# Patient Record
Sex: MALE | Race: WHITE | URBAN - METROPOLITAN AREA
[De-identification: names, ages, dates, MRNs, and addresses within clinical notes are randomized per-mention and may not be internally consistent; named-entity substitution may affect disease eponyms.]

---

## 2020-01-30 ENCOUNTER — DOCTOR'S OFFICE (OUTPATIENT)
Dept: URBAN - METROPOLITAN AREA CLINIC 125 | Facility: CLINIC | Age: 45
Setting detail: OPHTHALMOLOGY
End: 2020-01-30
Payer: COMMERCIAL

## 2020-01-30 ENCOUNTER — RX ONLY (RX ONLY)
Age: 45
End: 2020-01-30

## 2020-01-30 DIAGNOSIS — H52.4: ICD-10-CM

## 2020-01-30 PROCEDURE — 92015 DETERMINE REFRACTIVE STATE: CPT | Performed by: OPHTHALMOLOGY

## 2020-01-30 PROCEDURE — 92004 COMPRE OPH EXAM NEW PT 1/>: CPT | Performed by: OPHTHALMOLOGY

## 2020-01-30 PROCEDURE — 92310 CONTACT LENS FITTING OU: CPT | Performed by: OPHTHALMOLOGY

## 2020-01-30 ASSESSMENT — CONFRONTATIONAL VISUAL FIELD TEST (CVF)
OS_FINDINGS: FULL
OD_FINDINGS: FULL

## 2020-01-31 ASSESSMENT — REFRACTION_MANIFEST
OS_ADD: +1.50
OD_CYLINDER: -0.50
OS_SPHERE: -2.75
OD_VA1: 20/20
OS_VA2: 20/
OD_VA2: 20/
OS_VA1: 20/20
OD_AXIS: 180
OS_VA3: 20/
OU_VA: 20/
OD_VA3: 20/
OD_ADD: +1.50
OD_SPHERE: -2.75

## 2020-01-31 ASSESSMENT — REFRACTION_CURRENTRX
OS_AXIS: 017
OD_AXIS: 179
OD_OVR_VA: 20/
OD_VPRISM_DIRECTION: SV
OS_OVR_VA: 20/
OD_CYLINDER: -0.50
OS_SPHERE: -3.25
OD_SPHERE: -3.00
OS_VPRISM_DIRECTION: SV
OS_CYLINDER: -0.25

## 2020-01-31 ASSESSMENT — VISUAL ACUITY
OS_BCVA: 20/25+2
OD_BCVA: 20/20

## 2020-01-31 ASSESSMENT — KERATOMETRY
OS_AXISANGLE_DEGREES: 179
OD_K2POWER_DIOPTERS: 42.25
OD_AXISANGLE_DEGREES: 2
OS_K1POWER_DIOPTERS: 42.25
OD_K1POWER_DIOPTERS: 41.75
OS_K2POWER_DIOPTERS: 43.25

## 2020-01-31 ASSESSMENT — SPHEQUIV_DERIVED
OS_SPHEQUIV: -2.75
OD_SPHEQUIV: -2.75
OD_SPHEQUIV: -3

## 2020-01-31 ASSESSMENT — REFRACTION_AUTOREFRACTION
OS_SPHERE: -2.50
OS_CYLINDER: -0.50
OS_AXIS: 078
OD_SPHERE: -2.50
OD_CYLINDER: -0.50
OD_AXIS: 158

## 2020-01-31 ASSESSMENT — AXIALLENGTH_DERIVED
OD_AL: 25.3341
OD_AL: 25.4469
OS_AL: 25.0203

## 2020-02-04 ENCOUNTER — OPTICAL OFFICE (OUTPATIENT)
Dept: URBAN - NONMETROPOLITAN AREA CLINIC 4 | Facility: CLINIC | Age: 45
Setting detail: OPHTHALMOLOGY
End: 2020-02-04
Payer: COMMERCIAL

## 2020-02-04 DIAGNOSIS — H52.13: ICD-10-CM

## 2020-02-04 PROCEDURE — S0500 DISPOS CONT LENS: HCPCS | Performed by: OPHTHALMOLOGY

## 2020-02-13 ENCOUNTER — OPTICAL OFFICE (OUTPATIENT)
Dept: URBAN - NONMETROPOLITAN AREA CLINIC 4 | Facility: CLINIC | Age: 45
Setting detail: OPHTHALMOLOGY
End: 2020-02-13
Payer: COMMERCIAL

## 2020-02-13 DIAGNOSIS — H52.13: ICD-10-CM

## 2020-02-13 PROCEDURE — V2100 LENS SPHER SINGLE PLANO 4.00: HCPCS | Performed by: OPHTHALMOLOGY

## 2020-02-13 PROCEDURE — V2020 VISION SVCS FRAMES PURCHASES: HCPCS | Performed by: OPHTHALMOLOGY

## 2020-02-13 PROCEDURE — V2784 LENS POLYCARB OR EQUAL: HCPCS | Performed by: OPHTHALMOLOGY

## 2020-02-13 PROCEDURE — V2102 SINGL VISN SPHERE 7.12-20.00: HCPCS | Performed by: OPHTHALMOLOGY

## 2021-03-28 ENCOUNTER — APPOINTMENT (EMERGENCY)
Dept: RADIOLOGY | Facility: HOSPITAL | Age: 46
End: 2021-03-28
Payer: COMMERCIAL

## 2021-03-28 ENCOUNTER — HOSPITAL ENCOUNTER (EMERGENCY)
Facility: HOSPITAL | Age: 46
Discharge: HOME | End: 2021-03-28
Attending: EMERGENCY MEDICINE
Payer: COMMERCIAL

## 2021-03-28 VITALS
OXYGEN SATURATION: 97 % | TEMPERATURE: 97.5 F | SYSTOLIC BLOOD PRESSURE: 172 MMHG | HEART RATE: 80 BPM | RESPIRATION RATE: 20 BRPM | WEIGHT: 175 LBS | DIASTOLIC BLOOD PRESSURE: 92 MMHG

## 2021-03-28 DIAGNOSIS — S93.401A RIGHT ANKLE SPRAIN: Primary | ICD-10-CM

## 2021-03-28 PROCEDURE — 73620 X-RAY EXAM OF FOOT: CPT

## 2021-03-28 PROCEDURE — 99283 EMERGENCY DEPT VISIT LOW MDM: CPT

## 2021-03-28 PROCEDURE — 73600 X-RAY EXAM OF ANKLE: CPT

## 2021-03-28 PROCEDURE — 99284 EMERGENCY DEPT VISIT MOD MDM: CPT | Performed by: PHYSICIAN ASSISTANT

## 2021-03-28 RX ORDER — ACETAMINOPHEN 325 MG/1
650 TABLET ORAL ONCE
Status: COMPLETED | OUTPATIENT
Start: 2021-03-28 | End: 2021-03-28

## 2021-03-28 RX ADMIN — ACETAMINOPHEN 650 MG: 325 TABLET ORAL at 13:01

## 2021-03-28 NOTE — ED PROVIDER NOTES
History  Chief Complaint   Patient presents with    Ankle Injury     Pt was playing basketball, jumped and landed wrong on ankle, patient reports pain wit ambulation and swelling,      65-year-old male presents emergency department for evaluation of right ankle injury status post playing basketball yesterday jumping and landing on  Has been able to ambulate with pain  Using cane for assistance  Has been applying ice  No analgesia today  No weakness, numbness, paresthesias  No previous injury  History provided by:  Patient  Ankle Injury  Location:  Rt ankle  Quality:  Aching  Severity:  Mild  Onset quality:  Sudden  Duration:  1 day  Timing:  Constant  Progression:  Improving  Chronicity:  New  Context:  Jumped playing basketball and landed wrong  Relieved by:  Rest/ice  Worsened by: Movement  Associated symptoms: no abdominal pain, no chest pain, no congestion, no cough, no diarrhea, no ear pain, no fatigue, no fever, no headaches, no loss of consciousness, no myalgias, no nausea, no rash, no rhinorrhea, no shortness of breath, no sore throat, no vomiting and no wheezing        None       No past medical history on file  No past surgical history on file  No family history on file  I have reviewed and agree with the history as documented  No existing history information found  No existing history information found  Social History     Tobacco Use    Smoking status: Not on file   Substance Use Topics    Alcohol use: Not on file    Drug use: Not on file       Review of Systems   Constitutional: Negative  Negative for fatigue and fever  HENT: Negative  Negative for congestion, ear pain, rhinorrhea and sore throat  Respiratory: Negative  Negative for cough, shortness of breath and wheezing  Cardiovascular: Negative  Negative for chest pain  Gastrointestinal: Negative  Negative for abdominal pain, diarrhea, nausea and vomiting  Genitourinary: Negative      Musculoskeletal: Positive for arthralgias and joint swelling  Negative for myalgias  Skin: Negative  Negative for rash  Neurological: Negative  Negative for loss of consciousness and headaches  All other systems reviewed and are negative  Physical Exam  Physical Exam  Vitals signs and nursing note reviewed  Constitutional:       General: He is not in acute distress  Appearance: Normal appearance  He is normal weight  He is not ill-appearing, toxic-appearing or diaphoretic  HENT:      Head: Normocephalic and atraumatic  Nose: Nose normal       Mouth/Throat:      Mouth: Mucous membranes are moist       Pharynx: Oropharynx is clear  No oropharyngeal exudate or posterior oropharyngeal erythema  Eyes:      Extraocular Movements: Extraocular movements intact  Conjunctiva/sclera: Conjunctivae normal       Pupils: Pupils are equal, round, and reactive to light  Neck:      Musculoskeletal: Normal range of motion and neck supple  Cardiovascular:      Pulses:           Dorsalis pedis pulses are 2+ on the right side and 2+ on the left side  Posterior tibial pulses are 2+ on the right side and 2+ on the left side  Pulmonary:      Effort: Pulmonary effort is normal    Musculoskeletal:         General: Swelling and tenderness present  Right hip: Normal       Right knee: Normal       Right ankle: He exhibits decreased range of motion and swelling  He exhibits no deformity, no laceration and normal pulse  Tenderness  Lateral malleolus tenderness found  No head of 5th metatarsal tenderness found  Right foot: Normal range of motion  Tenderness, bony tenderness and swelling present  No deformity  Feet:    Skin:     General: Skin is warm and dry  Capillary Refill: Capillary refill takes less than 2 seconds  Coloration: Skin is not pale  Findings: No bruising, erythema or rash  Neurological:      General: No focal deficit present        Mental Status: He is alert and oriented to person, place, and time  Sensory: No sensory deficit  Motor: No weakness  Coordination: Coordination normal       Deep Tendon Reflexes: Reflexes normal    Psychiatric:         Mood and Affect: Mood normal          Behavior: Behavior normal          Thought Content: Thought content normal          Judgment: Judgment normal          Vital Signs  ED Triage Vitals [03/28/21 1225]   Temperature Pulse Respirations Blood Pressure SpO2   97 5 °F (36 4 °C) 80 20 (!) 172/92 97 %      Temp Source Heart Rate Source Patient Position - Orthostatic VS BP Location FiO2 (%)   Temporal Monitor Lying Right arm --      Pain Score       6           Vitals:    03/28/21 1225   BP: (!) 172/92   Pulse: 80   Patient Position - Orthostatic VS: Lying         Visual Acuity      ED Medications  Medications   acetaminophen (TYLENOL) tablet 650 mg (650 mg Oral Given 3/28/21 1301)       Diagnostic Studies  Results Reviewed     None                 XR foot 2 views RIGHT   ED Interpretation by Liss Hsu PA-C (03/28 1347)   No acute osseous injury      XR ankle 2 views RIGHT   ED Interpretation by Liss Hsu PA-C (03/28 1347)   No acute osseous injury                 Procedures  Procedures         ED Course  ED Course as of Mar 28 1348   Sun Mar 28, 2021   1347 ED interpretation of ankle and foot x-ray negative for acute osseous injury  Discussed results and findings with the patient  Ace wrap was applied  Patient offered crutches however is utilizing cane as needed  We discussed rice therapy and symptoms that require prompt return to the ED for further evaluation and patient verbalized understanding  He agreed to this treatment plan and was discharged home  SBIRT 22yo+      Most Recent Value   SBIRT (22 yo +)   In order to provide better care to our patients, we are screening all of our patients for alcohol and drug use  Would it be okay to ask you these screening questions?   No Filed at: 03/28/2021 1228                    [facility]  Number of Diagnoses or Management Options  Right ankle sprain: new and requires workup  Diagnosis management comments: 42-year-old was playing basketball yesterday jumped and landed on right ankle wrong  Pain in right ankle and lateral side of foot since yesterday  Has been improving with ice and rest   Vitals and medical record reviewed  Mild swelling and tenderness over the right lateral malleolus  ED interpretation of x-rays negative for acute osseous injury  Ace wrap was applied  Patient was educated on rice therapy and symptoms that require prompt return to the ED for further evaluation verbalized understanding  He agreed to this treatment plan and was discharged home  Amount and/or Complexity of Data Reviewed  Tests in the radiology section of CPT®: ordered and reviewed  Review and summarize past medical records: yes  Independent visualization of images, tracings, or specimens: yes        Disposition  Final diagnoses:   Right ankle sprain     Time reflects when diagnosis was documented in both MDM as applicable and the Disposition within this note     Time User Action Codes Description Comment    3/28/2021  1:01 PM Diandra Michel Add [V29 736I] Right ankle sprain       ED Disposition     ED Disposition Condition Date/Time Comment    Discharge Stable Sun Mar 28, 2021  1:00 PM [address] discharge to home/self care  Follow-up Information     Follow up With Specialties Details Why [address]  In 1 week As needed, If symptoms worsen [address] [address]  [phone]            There are no discharge medications for this patient  No discharge procedures on file      PDMP Review     None          ED Provider  Electronically Signed by           Bill Valdez PA-C  03/28/21 0466

## 2021-03-30 DIAGNOSIS — Z23 ENCOUNTER FOR IMMUNIZATION: ICD-10-CM

## 2022-01-06 ENCOUNTER — DOCTOR'S OFFICE (OUTPATIENT)
Dept: URBAN - NONMETROPOLITAN AREA CLINIC 2 | Facility: CLINIC | Age: 47
Setting detail: OPHTHALMOLOGY
End: 2022-01-06
Payer: COMMERCIAL

## 2022-01-06 DIAGNOSIS — Z01.00: ICD-10-CM

## 2022-01-06 DIAGNOSIS — H52.13: ICD-10-CM

## 2022-01-06 DIAGNOSIS — H52.4: ICD-10-CM

## 2022-01-06 PROCEDURE — 92310 CONTACT LENS FITTING OU: CPT | Performed by: OPTOMETRIST

## 2022-01-06 PROCEDURE — 92014 COMPRE OPH EXAM EST PT 1/>: CPT | Performed by: OPTOMETRIST

## 2022-01-06 ASSESSMENT — REFRACTION_CURRENTRX
OS_VPRISM_DIRECTION: SV
OD_CYLINDER: -0.50
OS_SPHERE: -3.25
OS_OVR_VA: 20/
OD_OVR_VA: 20/
OS_AXIS: 017
OS_CYLINDER: -0.25
OD_SPHERE: -3.00
OD_AXIS: 179
OD_VPRISM_DIRECTION: SV

## 2022-01-06 ASSESSMENT — REFRACTION_MANIFEST
OS_VA2: 20/20
OD_AXIS: 180
OD_SPHERE: -2.75
OS_ADD: +1.75
OD_VA2: 20/20
OD_ADD: +1.75
OS_CYLINDER: SPH
OS_VA1: 20/20
OD_VA1: 20/20
OS_SPHERE: -2.75
OD_CYLINDER: -0.50

## 2022-01-06 ASSESSMENT — VISUAL ACUITY
OS_BCVA: 20/20-2
OD_BCVA: 20/20-1

## 2022-01-06 ASSESSMENT — TONOMETRY
OS_IOP_MMHG: 15
OD_IOP_MMHG: 15

## 2022-01-06 ASSESSMENT — KERATOMETRY
OD_K1POWER_DIOPTERS: 41.75
OS_AXISANGLE_DEGREES: 179
OD_K2POWER_DIOPTERS: 42.25
OS_K1POWER_DIOPTERS: 42.25
OS_K2POWER_DIOPTERS: 43.25
OD_AXISANGLE_DEGREES: 2

## 2022-01-06 ASSESSMENT — AXIALLENGTH_DERIVED
OD_AL: 25.4469
OD_AL: 25.3341
OS_AL: 25.0203

## 2022-01-06 ASSESSMENT — REFRACTION_AUTOREFRACTION
OS_SPHERE: -2.50
OD_AXIS: 158
OD_CYLINDER: -0.50
OS_AXIS: 078
OD_SPHERE: -2.50
OS_CYLINDER: -0.50

## 2022-01-06 ASSESSMENT — SPHEQUIV_DERIVED
OD_SPHEQUIV: -2.75
OS_SPHEQUIV: -2.75
OD_SPHEQUIV: -3

## 2022-01-06 ASSESSMENT — CONFRONTATIONAL VISUAL FIELD TEST (CVF)
OS_FINDINGS: FULL
OD_FINDINGS: FULL

## 2022-01-24 ENCOUNTER — OPTICAL OFFICE (OUTPATIENT)
Dept: URBAN - NONMETROPOLITAN AREA CLINIC 4 | Facility: CLINIC | Age: 47
Setting detail: OPHTHALMOLOGY
End: 2022-01-24
Payer: COMMERCIAL

## 2022-01-24 DIAGNOSIS — H52.13: ICD-10-CM

## 2022-01-24 PROCEDURE — V2102 SINGL VISN SPHERE 7.12-20.00: HCPCS | Performed by: OPTOMETRIST

## 2022-01-24 PROCEDURE — V2784 LENS POLYCARB OR EQUAL: HCPCS | Performed by: OPTOMETRIST

## 2022-01-24 PROCEDURE — V2100 LENS SPHER SINGLE PLANO 4.00: HCPCS | Performed by: OPTOMETRIST

## 2022-01-24 PROCEDURE — V2020 VISION SVCS FRAMES PURCHASES: HCPCS | Performed by: OPTOMETRIST

## 2023-03-05 ENCOUNTER — ANESTHESIA (OUTPATIENT)
Dept: ANESTHESIOLOGY | Facility: HOSPITAL | Age: 48
End: 2023-03-05

## 2023-03-05 ENCOUNTER — ANESTHESIA EVENT (OUTPATIENT)
Dept: ANESTHESIOLOGY | Facility: HOSPITAL | Age: 48
End: 2023-03-05

## 2023-03-06 ENCOUNTER — ANESTHESIA EVENT (OUTPATIENT)
Dept: GASTROENTEROLOGY | Facility: HOSPITAL | Age: 48
End: 2023-03-06

## 2023-03-06 ENCOUNTER — HOSPITAL ENCOUNTER (OUTPATIENT)
Dept: GASTROENTEROLOGY | Facility: HOSPITAL | Age: 48
Setting detail: OUTPATIENT SURGERY
Discharge: HOME | End: 2023-03-06
Attending: INTERNAL MEDICINE | Admitting: INTERNAL MEDICINE

## 2023-03-06 ENCOUNTER — ANESTHESIA (OUTPATIENT)
Dept: GASTROENTEROLOGY | Facility: HOSPITAL | Age: 48
End: 2023-03-06

## 2023-03-06 VITALS
OXYGEN SATURATION: 96 % | SYSTOLIC BLOOD PRESSURE: 136 MMHG | DIASTOLIC BLOOD PRESSURE: 92 MMHG | HEIGHT: 71 IN | BODY MASS INDEX: 25.2 KG/M2 | WEIGHT: 180 LBS | HEART RATE: 70 BPM | TEMPERATURE: 98 F | RESPIRATION RATE: 18 BRPM

## 2023-03-06 DIAGNOSIS — Z12.11 ENCOUNTER FOR SCREENING FOR MALIGNANT NEOPLASM OF COLON: ICD-10-CM

## 2023-03-06 RX ORDER — PROPOFOL 10 MG/ML
INJECTION, EMULSION INTRAVENOUS CONTINUOUS PRN
Status: DISCONTINUED | OUTPATIENT
Start: 2023-03-06 | End: 2023-03-06

## 2023-03-06 RX ORDER — LIDOCAINE HYDROCHLORIDE 10 MG/ML
INJECTION, SOLUTION EPIDURAL; INFILTRATION; INTRACAUDAL; PERINEURAL AS NEEDED
Status: DISCONTINUED | OUTPATIENT
Start: 2023-03-06 | End: 2023-03-06

## 2023-03-06 RX ORDER — FENTANYL CITRATE 50 UG/ML
INJECTION, SOLUTION INTRAMUSCULAR; INTRAVENOUS AS NEEDED
Status: DISCONTINUED | OUTPATIENT
Start: 2023-03-06 | End: 2023-03-06

## 2023-03-06 RX ORDER — PROPOFOL 10 MG/ML
INJECTION, EMULSION INTRAVENOUS AS NEEDED
Status: DISCONTINUED | OUTPATIENT
Start: 2023-03-06 | End: 2023-03-06

## 2023-03-06 RX ORDER — SODIUM CHLORIDE, SODIUM LACTATE, POTASSIUM CHLORIDE, CALCIUM CHLORIDE 600; 310; 30; 20 MG/100ML; MG/100ML; MG/100ML; MG/100ML
INJECTION, SOLUTION INTRAVENOUS CONTINUOUS PRN
Status: DISCONTINUED | OUTPATIENT
Start: 2023-03-06 | End: 2023-03-06

## 2023-03-06 RX ADMIN — PROPOFOL 30 MG: 10 INJECTION, EMULSION INTRAVENOUS at 11:24

## 2023-03-06 RX ADMIN — LIDOCAINE HYDROCHLORIDE 100 MG: 10 INJECTION, SOLUTION EPIDURAL; INFILTRATION; INTRACAUDAL; PERINEURAL at 11:15

## 2023-03-06 RX ADMIN — FENTANYL CITRATE 50 MCG: 50 INJECTION INTRAMUSCULAR; INTRAVENOUS at 11:23

## 2023-03-06 RX ADMIN — PROPOFOL 180 MCG/KG/MIN: 10 INJECTION, EMULSION INTRAVENOUS at 11:16

## 2023-03-06 RX ADMIN — PROPOFOL 30 MG: 10 INJECTION, EMULSION INTRAVENOUS at 11:21

## 2023-03-06 RX ADMIN — PROPOFOL 30 MG: 10 INJECTION, EMULSION INTRAVENOUS at 11:18

## 2023-03-06 RX ADMIN — PROPOFOL 120 MG: 10 INJECTION, EMULSION INTRAVENOUS at 11:15

## 2023-03-06 RX ADMIN — SODIUM CHLORIDE, SODIUM LACTATE, POTASSIUM CHLORIDE, AND CALCIUM CHLORIDE: .6; .31; .03; .02 INJECTION, SOLUTION INTRAVENOUS at 11:05

## 2023-03-06 NOTE — ANESTHESIA POSTPROCEDURE EVALUATION
Post-Op Assessment Note    CV Status:  Stable  Pain Score: 0    Pain management: adequate     Mental Status:  Alert and awake   Hydration Status:  Euvolemic   PONV Controlled:  Controlled   Airway Patency:  Patent      Post Op Vitals Reviewed: Yes      Staff: CRNA         There were no known notable events for this encounter      BP   102/66   Temp 98 1   Pulse 81   Resp 20   SpO2 97%

## 2023-03-06 NOTE — ANESTHESIA PREPROCEDURE EVALUATION
Procedure:  PRE-OP ONLY    Relevant Problems   No relevant active problems      HTN    GERD  Physical Exam    Airway    Mallampati score: II  TM Distance: >3 FB  Neck ROM: full     Dental   No notable dental hx     Cardiovascular  Cardiovascular exam normal    Pulmonary  Pulmonary exam normal     Other Findings        Anesthesia Plan  ASA Score- 2     Anesthesia Type- IV sedation with anesthesia with ASA Monitors  Additional Monitors:   Airway Plan:           Plan Factors-Exercise tolerance (METS): >4 METS  Chart reviewed  EKG reviewed  Imaging results reviewed  Existing labs reviewed  Patient summary reviewed  Patient is not a current smoker  Patient not instructed to abstain from smoking on day of procedure  Patient did not smoke on day of surgery  Obstructive sleep apnea risk education given perioperatively  Induction-     Postoperative Plan-     Informed Consent- Anesthetic plan and risks discussed with patient  I personally reviewed this patient with the CRNA  Discussed and agreed on the Anesthesia Plan with the PAMELA Mills

## 2023-03-06 NOTE — H&P
History and Physical - [facility] at [facility] - [facility] 49 y o  male MRN: [id number]                  HPI: Loyd Olivas is a 49y o  year old male who presents for colonoscopy for colon cancer screening  This patient's index colonoscopy  REVIEW OF SYSTEMS: Per the HPI, and otherwise unremarkable  Historical Information   Past Medical History:   Diagnosis Date   • GERD (gastroesophageal reflux disease)    • Hyperlipidemia    • Hypertension      Past Surgical History:   Procedure Laterality Date   • HERNIA REPAIR       Social History   Social History     Substance and Sexual Activity   Alcohol Use Yes    Comment: occasional     Social History     Substance and Sexual Activity   Drug Use Yes   • Types: Marijuana    Comment: medical     Social History     Tobacco Use   Smoking Status Never   Smokeless Tobacco Never     History reviewed  No pertinent family history  Meds/Allergies     (Not in a hospital admission)      No Known Allergies    Objective     Blood pressure 144/85, pulse 84, temperature 98 9 °F (37 2 °C), temperature source Oral, resp  rate 20, height 5' 11" (1 803 m), weight 81 6 kg (180 lb), SpO2 99 %  PHYSICAL EXAM    Gen: NAD  CV: RRR  CHEST: Clear  ABD: soft, NT/ND  EXT: no edema      ASSESSMENT/PLAN:  This is a 49y o  year old male here for colonoscopy for colon cancer screening  Patient is stable and optimized for procedure      PLAN:   Procedure: Colonoscopy

## 2023-03-06 NOTE — ANESTHESIA PREPROCEDURE EVALUATION
Procedure:  COLONOSCOPY    Relevant Problems   No relevant active problems      HTN    GERD  Physical Exam    Airway    Mallampati score: II  TM Distance: >3 FB  Neck ROM: full     Dental   No notable dental hx     Cardiovascular  Cardiovascular exam normal    Pulmonary  Pulmonary exam normal     Other Findings        Anesthesia Plan  ASA Score- 2     Anesthesia Type- IV sedation with anesthesia with ASA Monitors  Additional Monitors:   Airway Plan:           Plan Factors-Exercise tolerance (METS): >4 METS  Chart reviewed  EKG reviewed  Imaging results reviewed  Existing labs reviewed  Patient summary reviewed  Patient is not a current smoker  Patient not instructed to abstain from smoking on day of procedure  Patient did not smoke on day of surgery  Obstructive sleep apnea risk education given perioperatively  Induction-     Postoperative Plan-     Informed Consent- Anesthetic plan and risks discussed with patient  I personally reviewed this patient with the CRNA  Discussed and agreed on the Anesthesia Plan with the CRNA  Obi Cobian

## 2023-10-16 ENCOUNTER — DOCTOR'S OFFICE (OUTPATIENT)
Dept: URBAN - NONMETROPOLITAN AREA CLINIC 2 | Facility: CLINIC | Age: 48
Setting detail: OPHTHALMOLOGY
End: 2023-10-16
Payer: COMMERCIAL

## 2023-10-16 DIAGNOSIS — H52.13: ICD-10-CM

## 2023-10-16 DIAGNOSIS — H52.4: ICD-10-CM

## 2023-10-16 PROBLEM — Z01.00 ENCOUNTER FOR EXAMINATION OF EYES AND VISION WITHOUT ABNORMAL FINDINGS 
- GOOD OCULAR HEALTH: Status: ACTIVE | Noted: 2023-10-16

## 2023-10-16 PROCEDURE — 92014 COMPRE OPH EXAM EST PT 1/>: CPT

## 2023-10-16 PROCEDURE — 92310 CONTACT LENS FITTING OU: CPT

## 2023-10-16 PROCEDURE — 92015 DETERMINE REFRACTIVE STATE: CPT

## 2023-10-16 ASSESSMENT — REFRACTION_CURRENTRX
OS_VPRISM_DIRECTION: SV
OD_CYLINDER: -0.50
OS_AXIS: 017
OD_OVR_VA: 20/
OD_SPHERE: -3.00
OD_AXIS: 179
OD_VPRISM_DIRECTION: SV
OS_OVR_VA: 20/
OS_CYLINDER: -0.25
OS_SPHERE: -3.25

## 2023-10-16 ASSESSMENT — REFRACTION_MANIFEST
OD_ADD: +2.00
OD_AXIS: 180
OS_VA1: 20/20
OD_SPHERE: -2.50
OD_CYLINDER: -0.50
OS_CYLINDER: SPH
OS_VA2: 20/20
OD_VA1: 20/20
OS_SPHERE: -2.50
OS_ADD: +2.00
OD_VA2: 20/20
OU_VA: 20/20

## 2023-10-16 ASSESSMENT — SPHEQUIV_DERIVED
OD_SPHEQUIV: -2.625
OD_SPHEQUIV: -2.75
OS_SPHEQUIV: -3

## 2023-10-16 ASSESSMENT — TONOMETRY
OD_IOP_MMHG: 20
OS_IOP_MMHG: 20

## 2023-10-16 ASSESSMENT — REFRACTION_AUTOREFRACTION
OD_AXIS: 005
OS_CYLINDER: 0.00
OS_SPHERE: -3.00
OD_SPHERE: -2.25
OD_CYLINDER: -0.75
OS_AXIS: 000

## 2023-10-16 ASSESSMENT — KERATOMETRY
OD_K2POWER_DIOPTERS: 42.25
OS_K2POWER_DIOPTERS: 43.25
OD_AXISANGLE_DEGREES: 2
OD_K1POWER_DIOPTERS: 41.75
OS_K1POWER_DIOPTERS: 42.25
OS_AXISANGLE_DEGREES: 179

## 2023-10-16 ASSESSMENT — CONFRONTATIONAL VISUAL FIELD TEST (CVF)
OS_FINDINGS: FULL
OD_FINDINGS: FULL

## 2023-10-16 ASSESSMENT — AXIALLENGTH_DERIVED
OD_AL: 25.2781
OD_AL: 25.3341
OS_AL: 25.1304

## 2023-10-16 ASSESSMENT — VISUAL ACUITY
OS_BCVA: 20/20
OD_BCVA: 20/20

## 2024-06-03 ENCOUNTER — TELEPHONE (OUTPATIENT)
Age: 49
End: 2024-06-03

## 2024-06-03 NOTE — TELEPHONE ENCOUNTER
New Patient  What is the reason for the patient's appointment?: Genital lesion, male     What office location does the patient prefer?: [place]    Does patient have Imaging/Lab Results:  No    Have patient records been requested?: N/A  If No, are the records showing in Epic: N/A      INSURANCE:   Do we accept the patient's insurance or is the patient Self-Pay?: Yes    Insurance Provider: [employer]  Plan Type/Number:   Member ID#: [id number]       HISTORY:   Has the patient had any previous Urologist(s)?: No    Was the patient seen in the ED?: No    Has the patient had any outside testing done?: No    Does the patient have a personal history of cancer?: N/A    Pt scheduled for the next available of 7/3 and added to wait list. Please review for appropriate appointment.

## 2024-06-24 PROBLEM — N48.9 PENILE LESION: Status: ACTIVE | Noted: 2024-06-24

## 2024-06-24 NOTE — PROGRESS NOTES
UROLOGY PROGRESS NOTE         NAME: Calvin Whitfield  AGE: 48 y.o. SEX: male  : 1975   MRN: [id number]    DATE: 2024  TIME: 9:27 AM    Assessment and Plan   Lesion Destruction    Date/Time: 7/3/2024 1:45 PM    Performed by: Ben Ford MD  Authorized by: Ben Ford MD    Procedure Details - Lesion Destruction:     Number of Lesions:  1  Lesion 1:     Body area:  Anogenital    Malignancy: benign lesion      Destruction method: electrosurgery       Patient was prepped and draped in usual sterile fashion of the external and to.  Betadine was used to prep the area of the lesion that was about 1 cm as described in the physical exam.  We then used 1% lidocaine 2 cc and then used the electrocautery to cauterize the lesion down to the base.  Patient tolerated procedure well       Impression:   1. Penile lesion       Plan: Status post electrocauterization of penile lesion.  Patient to follow-up as needed.  I do recommend when he turns 49 he should consider a JOSIAH PSA with his family doctor.      Chief Complaint   No chief complaint on file.    History of Present Illness     HPI: Calvin Whitfield is a 48 y.o. year old male who presents with evaluation of penile lesion.  Patient has no irritable or obstructive voiding complaints no ED.  He has noticed this lesion for a couple months on his penis.  He has had no discharge from the area.              The following portions of the patient's history were reviewed and updated as appropriate: allergies, current medications, past family history, past medical history, past social history, past surgical history and problem list.  Past Medical History:   Diagnosis Date    GERD (gastroesophageal reflux disease)     Hyperlipidemia     Hypertension      Past Surgical History:   Procedure Laterality Date    HERNIA REPAIR       shoulder  Review of Systems     Const: Denies chills, fever and weight loss.  CV: Denies chest pain.  Resp: Denies SOB.  GI: Denies  abdominal pain, nausea and vomiting.  : Denies symptoms other than stated above.  Musculo: Denies back pain.    Objective   There were no vitals taken for this visit.    Physical Exam  Const: Appears healthy and well developed. No signs of acute distress present.  Resp: Respirations are regular and unlabored.   CV: Rate is regular. Rhythm is regular.  Abdomen: Abdomen is soft, nontender, and nondistended. Kidneys are not palpable.  : Normal testicular exam and scrotal exam.  On the ventral surface of the penis there is a 1 cm wartlike lesion consistent with a nevus or possibly HPV.  Psych: Patient's attitude is cooperative. Mood is normal. Affect is normal.    Current Medications     Current Outpatient Medications:     lisinopril (ZESTRIL) 40 mg tablet, Take 40 mg by mouth every morning, Disp: , Rfl:     metroNIDAZOLE (METROCREAM) 0.75 % cream, APPLY TO FACE 2 TIMES DAILY, Disp: , Rfl:     rosuvastatin (CRESTOR) 5 mg tablet, Take 5 mg by mouth daily, Disp: , Rfl:         Ben Ford MD

## 2024-07-03 ENCOUNTER — OFFICE VISIT (OUTPATIENT)
Dept: UROLOGY | Facility: CLINIC | Age: 49
End: 2024-07-03
Payer: COMMERCIAL

## 2024-07-03 VITALS
DIASTOLIC BLOOD PRESSURE: 88 MMHG | WEIGHT: 187 LBS | HEART RATE: 85 BPM | SYSTOLIC BLOOD PRESSURE: 142 MMHG | BODY MASS INDEX: 25.33 KG/M2 | HEIGHT: 72 IN | OXYGEN SATURATION: 98 %

## 2024-07-03 DIAGNOSIS — N48.9 PENILE LESION: Primary | ICD-10-CM

## 2024-07-03 PROCEDURE — 99203 OFFICE O/P NEW LOW 30 MIN: CPT | Performed by: UROLOGY

## 2024-07-03 PROCEDURE — 17110 DESTRUCTION B9 LES UP TO 14: CPT | Performed by: UROLOGY

## 2025-03-20 ENCOUNTER — RX ONLY (RX ONLY)
Age: 50
End: 2025-03-20

## 2025-03-20 ENCOUNTER — DOCTOR'S OFFICE (OUTPATIENT)
Dept: URBAN - NONMETROPOLITAN AREA CLINIC 1 | Facility: CLINIC | Age: 50
Setting detail: OPHTHALMOLOGY
End: 2025-03-20
Payer: COMMERCIAL

## 2025-03-20 DIAGNOSIS — H52.4: ICD-10-CM

## 2025-03-20 DIAGNOSIS — H52.13: ICD-10-CM

## 2025-03-20 PROCEDURE — 92014 COMPRE OPH EXAM EST PT 1/>: CPT

## 2025-03-20 PROCEDURE — 92310 CONTACT LENS FITTING OU: CPT

## 2025-03-20 PROCEDURE — 92015 DETERMINE REFRACTIVE STATE: CPT

## 2025-03-20 ASSESSMENT — REFRACTION_MANIFEST
OD_SPHERE: -2.50
OD_VA1: 20/20
OD_CYLINDER: -0.50
OS_ADD: +2.00
OD_ADD: +2.00
OS_VA1: 20/20
OU_VA: 20/20
OD_AXIS: 180
OD_VA2: 20/20
OS_CYLINDER: SPH
OS_VA2: 20/20
OS_SPHERE: -2.50

## 2025-03-20 ASSESSMENT — REFRACTION_CURRENTRX
OD_AXIS: 179
OS_CYLINDER: -0.25
OD_OVR_VA: 20/
OD_CYLINDER: -0.50
OD_VPRISM_DIRECTION: SV
OS_VPRISM_DIRECTION: SV
OD_SPHERE: -3.00
OS_AXIS: 017
OS_SPHERE: -3.25
OS_OVR_VA: 20/

## 2025-03-20 ASSESSMENT — REFRACTION_AUTOREFRACTION
OS_AXIS: 039
OS_CYLINDER: -0.25
OD_SPHERE: -2.50
OD_CYLINDER: 0.00
OD_AXIS: 005
OS_SPHERE: -2.50

## 2025-03-20 ASSESSMENT — KERATOMETRY
OD_AXISANGLE_DEGREES: 2
OS_K1POWER_DIOPTERS: 42.25
OD_K2POWER_DIOPTERS: 42.25
OD_K1POWER_DIOPTERS: 41.75
OS_K2POWER_DIOPTERS: 43.25
OS_AXISANGLE_DEGREES: 179

## 2025-03-20 ASSESSMENT — VISUAL ACUITY
OD_BCVA: 20/20
OS_BCVA: 20/20

## 2025-03-20 ASSESSMENT — CONFRONTATIONAL VISUAL FIELD TEST (CVF)
OS_FINDINGS: FULL
OD_FINDINGS: FULL

## 2025-03-20 ASSESSMENT — TONOMETRY
OD_IOP_MMHG: 20
OS_IOP_MMHG: 20